# Patient Record
Sex: FEMALE | Race: WHITE | NOT HISPANIC OR LATINO | Employment: OTHER | URBAN - METROPOLITAN AREA
[De-identification: names, ages, dates, MRNs, and addresses within clinical notes are randomized per-mention and may not be internally consistent; named-entity substitution may affect disease eponyms.]

---

## 2024-03-29 ENCOUNTER — HOSPITAL ENCOUNTER (EMERGENCY)
Facility: CLINIC | Age: 79
Discharge: HOME | End: 2024-03-30
Attending: EMERGENCY MEDICINE | Admitting: EMERGENCY MEDICINE
Payer: COMMERCIAL

## 2024-03-29 ENCOUNTER — APPOINTMENT (OUTPATIENT)
Dept: GENERAL RADIOLOGY | Facility: CLINIC | Age: 79
End: 2024-03-29
Attending: EMERGENCY MEDICINE
Payer: COMMERCIAL

## 2024-03-29 DIAGNOSIS — M54.41 ACUTE BILATERAL LOW BACK PAIN WITH RIGHT-SIDED SCIATICA: ICD-10-CM

## 2024-03-29 PROCEDURE — 99284 EMERGENCY DEPT VISIT MOD MDM: CPT | Mod: GC | Performed by: EMERGENCY MEDICINE

## 2024-03-29 PROCEDURE — 250N000013 HC RX MED GY IP 250 OP 250 PS 637

## 2024-03-29 PROCEDURE — 72100 X-RAY EXAM L-S SPINE 2/3 VWS: CPT | Mod: 26 | Performed by: RADIOLOGY

## 2024-03-29 PROCEDURE — 99284 EMERGENCY DEPT VISIT MOD MDM: CPT | Performed by: EMERGENCY MEDICINE

## 2024-03-29 PROCEDURE — 250N000013 HC RX MED GY IP 250 OP 250 PS 637: Performed by: EMERGENCY MEDICINE

## 2024-03-29 PROCEDURE — 250N000012 HC RX MED GY IP 250 OP 636 PS 637

## 2024-03-29 PROCEDURE — 72100 X-RAY EXAM L-S SPINE 2/3 VWS: CPT

## 2024-03-29 RX ORDER — OXYCODONE HYDROCHLORIDE 5 MG/1
5 TABLET ORAL ONCE
Status: COMPLETED | OUTPATIENT
Start: 2024-03-29 | End: 2024-03-29

## 2024-03-29 RX ORDER — OXYCODONE HYDROCHLORIDE 5 MG/1
5 TABLET ORAL EVERY 6 HOURS PRN
Qty: 12 TABLET | Refills: 0 | Status: SHIPPED | OUTPATIENT
Start: 2024-03-29 | End: 2024-04-01

## 2024-03-29 RX ORDER — ACETAMINOPHEN 500 MG
1000 TABLET ORAL ONCE
Status: COMPLETED | OUTPATIENT
Start: 2024-03-29 | End: 2024-03-29

## 2024-03-29 RX ORDER — CYCLOBENZAPRINE HCL 10 MG
10 TABLET ORAL 3 TIMES DAILY PRN
Qty: 18 TABLET | Refills: 0 | Status: SHIPPED | OUTPATIENT
Start: 2024-03-29 | End: 2024-04-04

## 2024-03-29 RX ORDER — PREDNISONE 20 MG/1
60 TABLET ORAL ONCE
Status: COMPLETED | OUTPATIENT
Start: 2024-03-29 | End: 2024-03-29

## 2024-03-29 RX ORDER — IBUPROFEN 600 MG/1
600 TABLET, FILM COATED ORAL ONCE
Status: COMPLETED | OUTPATIENT
Start: 2024-03-29 | End: 2024-03-29

## 2024-03-29 RX ORDER — CYCLOBENZAPRINE HCL 5 MG
10 TABLET ORAL 3 TIMES DAILY
Status: DISCONTINUED | OUTPATIENT
Start: 2024-03-30 | End: 2024-03-30 | Stop reason: HOSPADM

## 2024-03-29 RX ORDER — HYDROMORPHONE HYDROCHLORIDE 1 MG/ML
0.5 INJECTION, SOLUTION INTRAMUSCULAR; INTRAVENOUS; SUBCUTANEOUS
Status: DISCONTINUED | OUTPATIENT
Start: 2024-03-29 | End: 2024-03-30 | Stop reason: HOSPADM

## 2024-03-29 RX ORDER — OXYCODONE HYDROCHLORIDE 5 MG/1
5 TABLET ORAL ONCE
Status: COMPLETED | OUTPATIENT
Start: 2024-03-30 | End: 2024-03-30

## 2024-03-29 RX ORDER — LIDOCAINE 4 G/G
1 PATCH TOPICAL ONCE
Status: DISCONTINUED | OUTPATIENT
Start: 2024-03-29 | End: 2024-03-30 | Stop reason: HOSPADM

## 2024-03-29 RX ORDER — PREDNISONE 20 MG/1
50 TABLET ORAL DAILY
Qty: 5 TABLET | Refills: 0 | Status: SHIPPED | OUTPATIENT
Start: 2024-03-29 | End: 2024-04-03

## 2024-03-29 RX ORDER — CYCLOBENZAPRINE HCL 5 MG
10 TABLET ORAL ONCE
Status: COMPLETED | OUTPATIENT
Start: 2024-03-29 | End: 2024-03-29

## 2024-03-29 RX ORDER — PROPRANOLOL HYDROCHLORIDE 20 MG/1
20 TABLET ORAL ONCE
Qty: 1 TABLET | Refills: 0 | Status: COMPLETED | OUTPATIENT
Start: 2024-03-29 | End: 2024-03-29

## 2024-03-29 RX ADMIN — PREDNISONE 60 MG: 20 TABLET ORAL at 22:52

## 2024-03-29 RX ADMIN — CYCLOBENZAPRINE 10 MG: 5 TABLET, FILM COATED ORAL at 18:45

## 2024-03-29 RX ADMIN — LIDOCAINE 4% 1 PATCH: 40 PATCH TOPICAL at 18:44

## 2024-03-29 RX ADMIN — PROPRANOLOL HYDROCHLORIDE 20 MG: 20 TABLET ORAL at 23:48

## 2024-03-29 RX ADMIN — OXYCODONE HYDROCHLORIDE 5 MG: 5 TABLET ORAL at 20:45

## 2024-03-29 RX ADMIN — ACETAMINOPHEN 1000 MG: 500 TABLET ORAL at 20:45

## 2024-03-29 RX ADMIN — OXYCODONE HYDROCHLORIDE 5 MG: 5 TABLET ORAL at 18:45

## 2024-03-29 ASSESSMENT — ACTIVITIES OF DAILY LIVING (ADL)
ADLS_ACUITY_SCORE: 35
ADLS_ACUITY_SCORE: 33
ADLS_ACUITY_SCORE: 35

## 2024-03-29 ASSESSMENT — COLUMBIA-SUICIDE SEVERITY RATING SCALE - C-SSRS
1. IN THE PAST MONTH, HAVE YOU WISHED YOU WERE DEAD OR WISHED YOU COULD GO TO SLEEP AND NOT WAKE UP?: NO
6. HAVE YOU EVER DONE ANYTHING, STARTED TO DO ANYTHING, OR PREPARED TO DO ANYTHING TO END YOUR LIFE?: NO
2. HAVE YOU ACTUALLY HAD ANY THOUGHTS OF KILLING YOURSELF IN THE PAST MONTH?: NO

## 2024-03-29 NOTE — ED PROVIDER NOTES
"    [place] EMERGENCY DEPARTMENT ([facility])    3/29/24       ED PROVIDER NOTE    History     Chief Complaint   Patient presents with    Back Pain    Spasms     HPI  Katie Howell is a 78 year old female with PMH notable for GERD, HTN, asthma, who presents to the Emergency Department for evaluation of right-sided back pain.     1 week ago, Katie Conrad was working on a project and sat a lot. She started having some Rt buttock pain. However, this pain worsened significantly yesterday when she bent down from a seated position and twisted to the Rt to pick something up from the floor and felt something \"pulled\" in her back-Rt buttock. Pain radiated from back-Rt buttock down to her groin and leg and has a lot of \"Charley horse\". This pain woke her up from sleep when he has to roll over. She has been managing this pain with tylenol 1000 mg BID and biofreeze, which takes the edge off.     Denied numbness, weakness, fever, chills, urinary/bowel dysfunction, weight loss. Denied falling/history of trauma, back surgeries.     History of disc herniation 2 years ago, treated with steroid injection in her back.     Past Medical History  No past medical history on file.  No past surgical history on file.  No current outpatient medications on file.    No Known Allergies  Family History  No family history on file.  Social History          A complete review of systems was performed with pertinent positives and negatives noted in the HPI, and all other systems negative.    Physical Exam   BP: (!) 176/96  Pulse: 69  Temp: 97.3  F (36.3  C)  Resp: 20  SpO2: 97 %      ED Course, Procedures, & Data   General: no acute distress.    HENT: Normocephalic and atraumatic. Trachea midline. Normal voice.  Eyes: EOMI, conjunctivae normal.    Cardiovascular:  Normal rate and regular rhythm.   No murmur heard.  Radial pulses 2+ bilaterally.  Pulmonary:  No respiratory distress. Normal breath sounds bilaterally.  Abdominal: no distension.  " Abdomen is soft. There is no mass. There is no abdominal tenderness.  Musculoskeletal:    Moving all extremities spontaneously.  No cyanosis, clubbing, or edema.Grossly intact strength and sensation of bilateral upper and lower extremities.  Midline spinal tenderness at the level of L5-S1 without any bony step-off.  Moderate reproducible tenderness to right sided paraspinal musculature at this level.  No abdominal or hip tenderness.  Negative CVA tenderness.  Positive right leg raise.  Skin: Warm, dry, and well perfused. Good turgor.  Neurological:  No focal deficit present.    Psychiatric:   The patient is awake, alert.  Appropriate mood and affect.    Procedures               No results found for any visits on 03/29/24.  Medications - No data to display  Labs Ordered and Resulted from Time of ED Arrival to Time of ED Departure - No data to display  No orders to display          Critical care was not performed.     Medical Decision Making  The patient's presentation was of moderate complexity (an acute complicated injury).  The patient's evaluation involved:  review of external note(s) from 3+ sources (see separate area of note for details)  review of 3+ test result(s) ordered prior to this encounter (see separate area of note for details)  ordering and/or review of 3+ test(s) in this encounter (see separate area of note for details)  independent interpretation of testing performed by another health professional (see separate area of note for details)    The patient's management necessitated high risk (a decision regarding hospitalization).    Assessment & Plan    Katie Howell is a 78 year old female with PMH notable for GERD, HTN, asthma, who presents to the Emergency Department for evaluation of low back pain radiating to the right leg for 1 week, associated with bending and twisting motion.    Differentials include herniated disc, compression fracture (given history of osteopenia), unlikely cauda equina  "syndrome given no numbness/weakness lower extremities, no bowel/bladder involvement or epidural abscess/hematoma given no infectious symptoms/no hypercoagulable state or signs of hypotension from blood loss.     Previous MRI reviewed.  Plan on supportive treatment with pain control including ibuprofen, flexeril, lidocaine patch, tylenol, oxycodone, and warm compress.  Lumbar x-ray negative for pathologic fracture.  She did require a few doses of p.o. pain medication in the emergency department with relief.  However pain would be exacerbated with rolling over for x-ray, and walking to the bathroom.  Reevaluated the patient multiple occasions.  Patient had \"the edge taken off\", however was interested in pursuing stronger options such as IV opioid medication.  Patient had remained in the waiting room during the duration of her emergency department course due to ED overcrowding and inpatient boarding in the emergency department.  Discussed that we would not be able to provide any IV opioids in the waiting room, and we would be happy to trial this if she would like to wait for a bed in the emergency department.  She declined and stated she would rather go home and lay down as that is more comfortable than the waiting room chair.    Due to the severity of the pain, patient will be discharged with a short course of oxycodone, Flexeril, steroids. Advised patient on physical therapy referral as outpatient by PCP as pt is from out of town.  She will also contact her previous physician who performed cortisone injections.  She was discharged with return precautions.    I have reviewed the nursing notes. I have reviewed the findings, diagnosis, plan and need for follow up with the patient.    New Prescriptions    No medications on file       Final diagnoses:   None       Milka Chua MD  [facility] EMERGENCY DEPARTMENT  3/29/2024    --    ED Attending Physician Attestation    I Cheyanne Rico MD, cared for " this patient with the Resident. I have performed a history and physical examination of the patient and discussed management with the resident. I reviewed the resident's documentation above and agree with the documented findings and plan of care.    Summary of HPI, PE, ED Course   Patient is a 78 year old female evaluated in the emergency department for low back pain and right sciatica. Exam and ED course notable for pain slightly improved with PO mutli-modal regimen. Recommended next line of stronger medications given IV, once placed in the ED, however she declined.  Shared decision making, she wanted to go home rather than waiting for an ED bed since she was feeling improved.  She has resources at home and was given resturn precautions.  Patient missed her nightly antihypertensive, which was given to her.  This as well as repeat pain medications improved her blood pressure.  After the completion of care in the emergency department, the patient was discharged.     Cheyanne Rico MD  03/30/24 0236

## 2024-03-29 NOTE — ED TRIAGE NOTES
Arrives by w/c with right sided back pain that goes into the right leg. Per patient back pain has been going on for about a week. Today it started spasming going into the right leg.     Triage Assessment (Adult)       Row Name 03/29/24 [id number]          Triage Assessment    Airway WDL WDL        Respiratory WDL    Respiratory WDL WDL        Cardiac WDL    Cardiac WDL WDL        Peripheral/Neurovascular WDL    Peripheral Neurovascular WDL WDL        Cognitive/Neuro/Behavioral WDL    Cognitive/Neuro/Behavioral WDL WDL

## 2024-03-30 VITALS
DIASTOLIC BLOOD PRESSURE: 102 MMHG | TEMPERATURE: 97.3 F | RESPIRATION RATE: 20 BRPM | SYSTOLIC BLOOD PRESSURE: 186 MMHG | OXYGEN SATURATION: 99 % | HEART RATE: 88 BPM

## 2024-03-30 PROCEDURE — 250N000013 HC RX MED GY IP 250 OP 250 PS 637: Performed by: EMERGENCY MEDICINE

## 2024-03-30 RX ADMIN — OXYCODONE HYDROCHLORIDE 5 MG: 5 TABLET ORAL at 00:01

## 2024-03-30 NOTE — DISCHARGE INSTRUCTIONS
As discussed, follow-up with your primary care doctor regarding restarting physical therapy or cortisone injections.  Return to the emergency department for any new or worsening symptoms or concerns.    You may take up to 1000 mg of Tylenol once every 6 hours.  If able, you can take up to 600 mg of ibuprofen with food once every 6 hours.  You can alternate and overlap these.  Use warm and cool compresses to help relax the muscle.  Perform slow stretches.  The lidocaine patch be put on your back, only keep on for 12 hours.  Then remove for 12 hours.  If you want to repeat this, you can purchase Lidoderm 4% patches over-the-counter at any pharmacy.  Again keep on for 12 hours and remove for 12 hours.